# Patient Record
Sex: FEMALE | Race: ASIAN | NOT HISPANIC OR LATINO | Employment: FULL TIME
[De-identification: names, ages, dates, MRNs, and addresses within clinical notes are randomized per-mention and may not be internally consistent; named-entity substitution may affect disease eponyms.]

---

## 2018-05-10 ENCOUNTER — RECORDS - HEALTHEAST (OUTPATIENT)
Dept: ADMINISTRATIVE | Facility: OTHER | Age: 23
End: 2018-05-10

## 2018-05-10 LAB — PAP SMEAR - HIM PATIENT REPORTED: NORMAL

## 2019-10-23 ENCOUNTER — VIRTUAL VISIT (OUTPATIENT)
Dept: FAMILY MEDICINE | Facility: OTHER | Age: 24
End: 2019-10-23

## 2019-10-24 NOTE — PROGRESS NOTES
"Date: 10/23/2019 12:00:48  Clinician: Shereen Banda  Clinician NPI: [id number]  Patient: Blessing Bassett  Patient : 1995  Patient Address: [address], [place] [address]  Patient Phone: [phone]  Visit Protocol: URI  Patient Summary:  Blessing is a 24 year old ( : 1995 ) female who initiated a Visit for cold, sinus infection, or influenza. When asked the question \"Please sign me up to receive news, health information and promotions. \", Blessing responded \"No\".    Blessing states her symptoms started suddenly 3-6 days ago.   Her symptoms consist of a headache, a sore throat, malaise, a cough, myalgia, enlarged lymph nodes, and chills. Blessing also feels feverish but was unable to measure her temperature.   Symptom details     Cough: Blessing coughs a few times an hour and her cough is more bothersome at night. Phlegm comes into her throat when she coughs. She does not believe the phlegm causes the cough. The color of the phlegm is yellow and white.     Sore throat: Blessing reports having moderate throat pain (4-6 on a 10 point pain scale), does not have exudate on her tonsils, and can swallow liquids. The lymph nodes in her neck are enlarged. A rash has not appeared on the skin since the sore throat started.     Headache: She states the headache is moderate (4-6 on a 10 point pain scale).      Blessing denies having rhinitis, wheezing, teeth pain, facial pain or pressure, nasal congestion, and ear pain. She also denies taking antibiotic medication for the symptoms, having recent facial or sinus surgery in the past 60 days, having a sinus infection within the past year, and double sickening (worsening symptoms after initial improvement). She is not experiencing dyspnea.   Precipitating events  Within the past week, Blessing has not been exposed to someone with strep throat. She has not recently been exposed to someone with influenza. Blessing has not been in close contact with any high risk individuals.   Pertinent " medical history  Blessing does not get yeast infections when she takes antibiotics.   Weight: 150 lbs   Blessing does not smoke or use smokeless tobacco.   She denies pregnancy and denies breastfeeding. She has menstruated in the past month.     MEDICATIONS: Advil Liqui-Gel oral, ALLERGIES: NKDA  Clinician Response:  Dear Blessing,  Based on the information provided, you have a viral upper respiratory infection, otherwise known as a cold. Symptoms vary from person to person, but can include sneezing, coughing, a runny nose, sore throat, and headache and range from mild to severe.  Unfortunately, there are no medications that can cure a cold, so treatment is focused on controlling symptoms as much as possible. Most people gradually feel better until symptoms are gone in 1-2 weeks.  Medication information  Because you have a viral infection, antibiotics will not help you get better. Treating a viral infection with antibiotics could actually make you feel worse.  Self care  The following tips will keep you as comfortable as possible while you recover:     Rest    Drink plenty of water and other liquids    Take a hot shower to loosen congestion    Use throat lozenges    Gargle with warm salt water (1/4 teaspoon of salt per 8 ounce glass of water)    Suck on frozen items such as popsicles or ice cubes    Drink hot tea with lemon and honey    Take a spoonful of honey to reduce your cough     When to seek care  Please be seen in a clinic or urgent care if new symptoms develop, or symptoms become worse.  Call 911 or go to the emergency room if you feel that your throat is closing off, you suddenly develop a rash, you are unable to swallow fluids, you are drooling, or you are having difficulty breathing.   Diagnosis: Viral URI  Diagnosis ICD: J06.9

## 2020-01-14 ENCOUNTER — OFFICE VISIT - HEALTHEAST (OUTPATIENT)
Dept: FAMILY MEDICINE | Facility: CLINIC | Age: 25
End: 2020-01-14

## 2020-01-14 DIAGNOSIS — R31.0 GROSS HEMATURIA: ICD-10-CM

## 2020-01-14 DIAGNOSIS — J06.9 URI WITH COUGH AND CONGESTION: ICD-10-CM

## 2020-01-14 LAB
ALBUMIN SERPL-MCNC: 3 G/DL (ref 3.5–5)
ALBUMIN UR-MCNC: ABNORMAL MG/DL
ALP SERPL-CCNC: 62 U/L (ref 45–120)
ALT SERPL W P-5'-P-CCNC: 10 U/L (ref 0–45)
ANION GAP SERPL CALCULATED.3IONS-SCNC: 8 MMOL/L (ref 5–18)
APPEARANCE UR: CLEAR
AST SERPL W P-5'-P-CCNC: 13 U/L (ref 0–40)
BACTERIA #/AREA URNS HPF: ABNORMAL HPF
BILIRUB SERPL-MCNC: 0.2 MG/DL (ref 0–1)
BILIRUB UR QL STRIP: NEGATIVE
BUN SERPL-MCNC: 7 MG/DL (ref 8–22)
CALCIUM SERPL-MCNC: 8.7 MG/DL (ref 8.5–10.5)
CHLORIDE BLD-SCNC: 105 MMOL/L (ref 98–107)
CO2 SERPL-SCNC: 28 MMOL/L (ref 22–31)
COLOR UR AUTO: YELLOW
CREAT SERPL-MCNC: 0.73 MG/DL (ref 0.6–1.1)
GFR SERPL CREATININE-BSD FRML MDRD: >60 ML/MIN/1.73M2
GLUCOSE BLD-MCNC: 102 MG/DL (ref 70–125)
GLUCOSE UR STRIP-MCNC: NEGATIVE MG/DL
HGB UR QL STRIP: ABNORMAL
KETONES UR STRIP-MCNC: NEGATIVE MG/DL
LEUKOCYTE ESTERASE UR QL STRIP: ABNORMAL
NITRATE UR QL: NEGATIVE
PH UR STRIP: 7 [PH] (ref 5–8)
POTASSIUM BLD-SCNC: 3.7 MMOL/L (ref 3.5–5)
PROT SERPL-MCNC: 6.3 G/DL (ref 6–8)
RBC #/AREA URNS AUTO: ABNORMAL HPF
SODIUM SERPL-SCNC: 141 MMOL/L (ref 136–145)
SP GR UR STRIP: 1.01 (ref 1–1.03)
SQUAMOUS #/AREA URNS AUTO: ABNORMAL LPF
UROBILINOGEN UR STRIP-ACNC: ABNORMAL
WBC #/AREA URNS AUTO: ABNORMAL HPF

## 2020-01-14 RX ORDER — IBUPROFEN 200 MG
200 TABLET ORAL EVERY 6 HOURS PRN
Status: SHIPPED | COMMUNITY
Start: 2020-01-14

## 2020-01-14 ASSESSMENT — MIFFLIN-ST. JEOR: SCORE: 1318.75

## 2020-01-15 LAB — BACTERIA SPEC CULT: NO GROWTH

## 2020-01-28 ENCOUNTER — RECORDS - HEALTHEAST (OUTPATIENT)
Dept: HEALTH INFORMATION MANAGEMENT | Facility: CLINIC | Age: 25
End: 2020-01-28

## 2020-02-11 ENCOUNTER — RECORDS - HEALTHEAST (OUTPATIENT)
Dept: ADMINISTRATIVE | Facility: OTHER | Age: 25
End: 2020-02-11

## 2020-02-14 ENCOUNTER — AMBULATORY - HEALTHEAST (OUTPATIENT)
Dept: LAB | Facility: CLINIC | Age: 25
End: 2020-02-14

## 2020-02-14 DIAGNOSIS — R31.9 HEMATURIA SYNDROME: ICD-10-CM

## 2020-02-14 DIAGNOSIS — R80.9 PROTEINURIA: ICD-10-CM

## 2021-05-28 ENCOUNTER — RECORDS - HEALTHEAST (OUTPATIENT)
Dept: ADMINISTRATIVE | Facility: CLINIC | Age: 26
End: 2021-05-28

## 2021-06-04 VITALS
DIASTOLIC BLOOD PRESSURE: 72 MMHG | WEIGHT: 147.75 LBS | HEART RATE: 76 BPM | SYSTOLIC BLOOD PRESSURE: 118 MMHG | BODY MASS INDEX: 29.01 KG/M2 | HEIGHT: 60 IN

## 2021-06-05 NOTE — PROGRESS NOTES
Assessment & Plan   1. Gross hematuria  Gross hematuria x3 days without visible clots.  Significant proteinuria. Renal function normal however low albumin.  Consider possible association with recent URI. Recheck labs to confirm and urgent referral to nephrology.    - Ambulatory referral to Nephrology  - Urinalysis- if Indicated  - Comprehensive Metabolic Panel    2. URI with cough and congestion  Lungs clear on exam, reassured likely viral in nature    Vivi Simon CNP    Subjective   Chief Complaint:  Establish Care; Follow-up (pt was seen at an urgent care clinic on 1/13/20 and was told to f/u with a primary care provider about the results); and Cough (productive cough x 4 days; very thick phlegm )    HPI:   Blessing Bassett is a 24 y.o. female who presents for follow up.     PMH notable for appendectomy, otherwise healthy.     Seen in UC yesterday for hematuria.  Urine visibly pink in color.  LMP ended 1/9. UA with large blood, no obvious cystitis, culture negative. Significant proteinuria on UA as well. Normal renal function. Creatinine 0.78. Low serum albumin. Concern for possible nephrotic syndrome or glomerulonephritis.  No edema.     She states she first noted pink urine three days ago.  No visible blood clots.  No flank pain. Does have mild URI symptoms, these began mid last week. Chills/warm this past weekend but didn't measure temperature.  Feeling better.  Cough is still persistent. No vigorous exercise.  Grandparents have had stones, no other renal history.  No medications.  On Nexplanon.  Ibuprofen once a month with menstrual cycle.       Allergies:  has No Known Allergies.    SH/FH:  Social History and Family History reviewed and updated.   Tobacco Status:  She  reports that she has never smoked. She has never used smokeless tobacco.    Review of Systems:  A complete head to toe ROS is negative unless otherwise noted in HPI    Objective     Vitals:    01/14/20 1543   Weight: 147 lb 12 oz (67 kg)  "  Height: 4' 11.5\" (1.511 m)       Physical Exam:  GENERAL: Alert, well-appearing female .   PSYCH: Pleasant mood, affect appropriate.   MOUTH: Pharynx moist, pink without exudate. No tonsillar enlargement  NECK: No lymphadenopathy.  CV: Regular rate and rhythm without murmurs, rubs or gallops.  RESP: Lung sounds clear, no rhonchi or rales.   ABDOMEN: BS+. Abdomen soft, nontender to palpation without guarding. No organomegaly, masses or hernias  PV : No edema      "

## 2021-06-20 ENCOUNTER — HEALTH MAINTENANCE LETTER (OUTPATIENT)
Age: 26
End: 2021-06-20

## 2021-10-10 ENCOUNTER — HEALTH MAINTENANCE LETTER (OUTPATIENT)
Age: 26
End: 2021-10-10

## 2022-07-16 ENCOUNTER — HEALTH MAINTENANCE LETTER (OUTPATIENT)
Age: 27
End: 2022-07-16

## 2022-09-18 ENCOUNTER — HEALTH MAINTENANCE LETTER (OUTPATIENT)
Age: 27
End: 2022-09-18

## 2023-07-30 ENCOUNTER — HEALTH MAINTENANCE LETTER (OUTPATIENT)
Age: 28
End: 2023-07-30

## 2024-10-07 LAB
ABO/RH(D): NORMAL
ANTIBODY SCREEN: NEGATIVE
SPECIMEN EXPIRATION DATE: NORMAL

## 2024-10-08 ENCOUNTER — PRENATAL OFFICE VISIT (OUTPATIENT)
Dept: MIDWIFE SERVICES | Facility: CLINIC | Age: 29
End: 2024-10-08
Payer: COMMERCIAL

## 2024-10-08 VITALS
SYSTOLIC BLOOD PRESSURE: 116 MMHG | DIASTOLIC BLOOD PRESSURE: 72 MMHG | WEIGHT: 144 LBS | BODY MASS INDEX: 28.27 KG/M2 | HEIGHT: 60 IN

## 2024-10-08 DIAGNOSIS — D75.89 LOW MEAN CORPUSCULAR VOLUME (MCV) DURING PREGNANCY: ICD-10-CM

## 2024-10-08 DIAGNOSIS — O28.3 ABNORMAL FETAL ULTRASOUND: ICD-10-CM

## 2024-10-08 DIAGNOSIS — Z34.00 SUPERVISION OF NORMAL FIRST PREGNANCY, ANTEPARTUM: Primary | ICD-10-CM

## 2024-10-08 DIAGNOSIS — O99.119 LOW MEAN CORPUSCULAR VOLUME (MCV) DURING PREGNANCY: ICD-10-CM

## 2024-10-08 LAB
ERYTHROCYTE [DISTWIDTH] IN BLOOD BY AUTOMATED COUNT: 14.3 % (ref 10–15)
EST. AVERAGE GLUCOSE BLD GHB EST-MCNC: 100 MG/DL
HBA1C MFR BLD: 5.1 % (ref 0–5.6)
HBV SURFACE AG SERPL QL IA: NONREACTIVE
HCT VFR BLD AUTO: 35.9 % (ref 35–47)
HCV AB SERPL QL IA: NONREACTIVE
HGB BLD-MCNC: 11.7 G/DL (ref 11.7–15.7)
HIV 1+2 AB+HIV1 P24 AG SERPL QL IA: NONREACTIVE
MCH RBC QN AUTO: 23.6 PG (ref 26.5–33)
MCHC RBC AUTO-ENTMCNC: 32.6 G/DL (ref 31.5–36.5)
MCV RBC AUTO: 72 FL (ref 78–100)
PLATELET # BLD AUTO: 304 10E3/UL (ref 150–450)
RBC # BLD AUTO: 4.96 10E6/UL (ref 3.8–5.2)
RUBV IGG SERPL QL IA: 0.56 INDEX
RUBV IGG SERPL QL IA: NORMAL
T PALLIDUM AB SER QL: NONREACTIVE
WBC # BLD AUTO: 7.7 10E3/UL (ref 4–11)

## 2024-10-08 PROCEDURE — 86901 BLOOD TYPING SEROLOGIC RH(D): CPT | Performed by: ADVANCED PRACTICE MIDWIFE

## 2024-10-08 PROCEDURE — 87340 HEPATITIS B SURFACE AG IA: CPT | Performed by: ADVANCED PRACTICE MIDWIFE

## 2024-10-08 PROCEDURE — 83036 HEMOGLOBIN GLYCOSYLATED A1C: CPT | Performed by: ADVANCED PRACTICE MIDWIFE

## 2024-10-08 PROCEDURE — 86762 RUBELLA ANTIBODY: CPT | Performed by: ADVANCED PRACTICE MIDWIFE

## 2024-10-08 PROCEDURE — 87086 URINE CULTURE/COLONY COUNT: CPT | Performed by: ADVANCED PRACTICE MIDWIFE

## 2024-10-08 PROCEDURE — 86780 TREPONEMA PALLIDUM: CPT | Performed by: ADVANCED PRACTICE MIDWIFE

## 2024-10-08 PROCEDURE — 86900 BLOOD TYPING SEROLOGIC ABO: CPT | Performed by: ADVANCED PRACTICE MIDWIFE

## 2024-10-08 PROCEDURE — 84702 CHORIONIC GONADOTROPIN TEST: CPT | Performed by: ADVANCED PRACTICE MIDWIFE

## 2024-10-08 PROCEDURE — 85027 COMPLETE CBC AUTOMATED: CPT | Performed by: ADVANCED PRACTICE MIDWIFE

## 2024-10-08 PROCEDURE — 36415 COLL VENOUS BLD VENIPUNCTURE: CPT | Performed by: ADVANCED PRACTICE MIDWIFE

## 2024-10-08 PROCEDURE — 83020 HEMOGLOBIN ELECTROPHORESIS: CPT | Performed by: ADVANCED PRACTICE MIDWIFE

## 2024-10-08 PROCEDURE — 86850 RBC ANTIBODY SCREEN: CPT | Performed by: ADVANCED PRACTICE MIDWIFE

## 2024-10-08 PROCEDURE — G0145 SCR C/V CYTO,THINLAYER,RESCR: HCPCS | Performed by: ADVANCED PRACTICE MIDWIFE

## 2024-10-08 PROCEDURE — 87389 HIV-1 AG W/HIV-1&-2 AB AG IA: CPT | Performed by: ADVANCED PRACTICE MIDWIFE

## 2024-10-08 PROCEDURE — 87624 HPV HI-RISK TYP POOLED RSLT: CPT | Performed by: ADVANCED PRACTICE MIDWIFE

## 2024-10-08 PROCEDURE — 99204 OFFICE O/P NEW MOD 45 MIN: CPT | Performed by: ADVANCED PRACTICE MIDWIFE

## 2024-10-08 PROCEDURE — 86803 HEPATITIS C AB TEST: CPT | Performed by: ADVANCED PRACTICE MIDWIFE

## 2024-10-08 RX ORDER — VITAMIN A, VITAMIN C, VITAMIN D-3, VITAMIN E, VITAMIN B-1, VITAMIN B-2, NIACIN, VITAMIN B-6, CALCIUM, IRON, ZINC, COPPER 4000; 120; 400; 22; 1.84; 3; 20; 10; 1; 12; 200; 27; 25; 2 [IU]/1; MG/1; [IU]/1; MG/1; MG/1; MG/1; MG/1; MG/1; MG/1; UG/1; MG/1; MG/1; MG/1; MG/1
TABLET ORAL DAILY
COMMUNITY

## 2024-10-08 ASSESSMENT — EDINBURGH POSTNATAL DEPRESSION SCALE (EPDS)
I HAVE BEEN ABLE TO LAUGH AND SEE THE FUNNY SIDE OF THINGS: AS MUCH AS I ALWAYS COULD
I HAVE LOOKED FORWARD WITH ENJOYMENT TO THINGS: AS MUCH AS I EVER DID
TOTAL SCORE: 7
I HAVE BLAMED MYSELF UNNECESSARILY WHEN THINGS WENT WRONG: NO, NEVER
I HAVE FELT SCARED OR PANICKY FOR NO GOOD REASON: NO, NOT MUCH
I HAVE BEEN ANXIOUS OR WORRIED FOR NO GOOD REASON: YES, SOMETIMES
THE THOUGHT OF HARMING MYSELF HAS OCCURRED TO ME: HARDLY EVER
I HAVE BEEN SO UNHAPPY THAT I HAVE BEEN CRYING: ONLY OCCASIONALLY
I HAVE BEEN SO UNHAPPY THAT I HAVE HAD DIFFICULTY SLEEPING: NOT AT ALL
I HAVE FELT SAD OR MISERABLE: NOT VERY OFTEN
THINGS HAVE BEEN GETTING ON TOP OF ME: NO, MOST OF THE TIME I HAVE COPED QUITE WELL

## 2024-10-08 NOTE — PROGRESS NOTES
"PRENATAL VISIT   FIRST OBSTETRICAL EXAM - OB     Assessment / Impression   First prenatal visit at 8w4d  29 year old    Last pap = today  Pre-pregnant BMI 28.61  EDPS = 6, \"1\" to #10    Plan:     -IOB labs drawn including HgbA1C.   -Pt is not a candidate for drawing lead level per [facility] screening tool.   -Patient is not interested in waterbirth.  Waterbirth education shared in IOB packet.   -Reviewed prenatal care schedule.   -Optimal nutrition and weight gain discussed. Pregnancy weight gain of 15-25 lbs (BMI 25.0-29.9) encouraged.   -Anticipatory guidance for common pregnancy questions and concerns reviewed.   -Danger s/sx for this trimester reviewed with patient.   -Reviewed genetic screening options with patient, patient does not elect for first trimester screening. The patient does not elect for quad screening.   -Reviewed carrier testing options with patient, patient does not elect for testing or referral to genetic counseling.  -Dating ultrasound ordered, patient to schedule   -IOB packet given and reviewed with patient.   -CNM services and hospital options reviewed; emergency and scheduling phone numbers given to patient.   -Because the patient does not have 1 high risk nor 2 or more moderate risk factors, low-dose aspirin not be initiated.  -Antepartum VTE risk factors absent.  -Pt is not a candidate for an antepartum OB consult.    -Return to clinic in 4 weeks or sooner as needed.    Total time: 45 minutes spent on the date of the encounter doing chart review, review of test results, patient visit and documentation.     Subjective:   Blessing Bassett is a 29 year old  here today for her first obstetrical exam at Unknown. Here with Valerio . This pregnancy is planned. The patient reports fatigue. She has a certain LMP.  Patient's last menstrual period was 2024 (exact date)., predicting an expected date of delivery of Estimated Date of Delivery: May 16, 2025. Last period was normal. Her pregnancy is " "dated by LMP.     The patient states that she is in a monogamous relationship and states that she is safe. Offered GC/CT screening today and patient declines. Additional pregnancy symptoms include: fatigue.     Risk factors: none. Pregnancy Risk Factors:None    The patient has the following high risk factors for preeclampsia: none.   The patient has the following moderate risk factors for preeclampsia: first pregnancy    The patient has the following antepartum risk factors for VTE (two or more risk factors, or 1 * risk factor, places patient at higher risk): none.    Clinical history/risk factors requiring antepartum OB consult: none.    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   OB History    Para Term  AB Living   1 0 0 0 0 0   SAB IAB Ectopic Multiple Live Births   0 0 0 0 0      # Outcome Date GA Lbr Amado/2nd Weight Sex Type Anes PTL Lv   1 Current                 History:   Past Medical History:   Diagnosis Date    Varicella       Past Surgical History:   Procedure Laterality Date    APPENDECTOMY        No family history on file.   Social History     Tobacco Use    Smoking status: Never    Smokeless tobacco: Never      Current Outpatient Medications   Medication Sig Dispense Refill    etonogestrel (NEXPLANON) 68 mg Impl implant [ETONOGESTREL (NEXPLANON) 68 MG IMPL IMPLANT] 1 each by Subdermal route once. Inserted 2018      ibuprofen (ADVIL,MOTRIN) 200 MG tablet [IBUPROFEN (ADVIL,MOTRIN) 200 MG TABLET] Take 200 mg by mouth every 6 (six) hours as needed for pain.        No Known Allergies     The patient's medical, surgical and family histories were reviewed and were pertinent to this visit.     Pap smear: Last Pap: 2018, Result: NIL, Previous History: none, Any history of abnormal: no. Next Due: today.     EPDS score today: 6.\"1\" to #10, patient denies any concerns and reports good family support.    History of anxiety or " "depression: no    Review of Systems   General: Fatigue but otherwise denies problem   Eyes: Denies problem   Ears/Nose/Throat: Denies problem   Cardiovascular: Denies problem   Respiratory: Denies problem   Gastrointestinal: Nausea without vomiting, otherwise negative   Genitourinary: Denies any discharge, vaginal bleeding or itchiness or any other problem   Musculoskeletal: Breast tenderness otherwise denies problem   Skin: Denies problem   Neurologic: Denies problem   Psychiatric: Denies problem   Endocrine: Denies problem   Heme/Lymphatic: Denies problem   Allergic/Immunologic: Denies problem           Objective:   Objective    Vitals:    10/08/24 1258   BP: 116/72   Weight: 65.3 kg (144 lb)   Height: 1.511 m (4' 11.5\")        Physical Exam:   General Appearance: Alert, cooperative, no distress, appears stated age   HEENT: Normocephalic, without obvious abnormality, atraumatic. Conjunctiva/corneas clear   Neck: Supple, symmetrical, trachea midline, no adenopathy.   Thyroid: not enlarged, symmetric, no tenderness/mass/nodules   Back: Symmetric, no curvature, ROM normal, no CVA tenderness   Lungs: Clear to auscultation bilaterally, respirations unlabored   Heart: Regular rate and rhythm, S1 and S2 normal, no murmur, rub, or gallop. No edema to lower extremities.   Breasts: deferred   Abdomen: Gravid, soft, non-tender, bowel sounds active all four quadrants, no masses.    Vulva:  no sign of lesions or condyloma, normal hair distribution   Vagina: pink, normal rugae, no abnormal discharge   Cervix:  long/thick/closed, no lesions or inflammation noted, negative CMT with exam   Uterus: mid position, non tender, enlarged approximately 9 weeks size   Adnexa:  no masses appreciated, non-tender with palpation   Musculoskeletal: Extremities normal, atraumatic, no cyanosis   Skin: Skin color, texture, turgor normal, no rashes or lesions   Lymph nodes: Cervical, supraclavicular, and axillary nodes normal   Neurologic: Alert " and oriented x 3. Normal speech

## 2024-10-08 NOTE — NURSING NOTE
Depression Response    Patient completed the PHQ-9 assessment for depression and scored >9? No  Question 9 on the PHQ-9 was positive for suicidality? Yes  Does patient have current mental health provider? No    Is this a virtual visit? No    I personally notified the following: visit provider

## 2024-10-09 ENCOUNTER — HOSPITAL ENCOUNTER (OUTPATIENT)
Dept: ULTRASOUND IMAGING | Facility: HOSPITAL | Age: 29
Discharge: HOME | End: 2024-10-09
Attending: ADVANCED PRACTICE MIDWIFE | Admitting: ADVANCED PRACTICE MIDWIFE
Payer: COMMERCIAL

## 2024-10-09 DIAGNOSIS — O28.3 ABNORMAL FETAL ULTRASOUND: Primary | ICD-10-CM

## 2024-10-09 DIAGNOSIS — Z34.00 SUPERVISION OF NORMAL FIRST PREGNANCY, ANTEPARTUM: ICD-10-CM

## 2024-10-09 LAB
BACTERIA UR CULT: NO GROWTH
HCG INTACT+B SERPL-ACNC: ABNORMAL MIU/ML
HPV HR 12 DNA CVX QL NAA+PROBE: NEGATIVE
HPV16 DNA CVX QL NAA+PROBE: NEGATIVE
HPV18 DNA CVX QL NAA+PROBE: NEGATIVE
HUMAN PAPILLOMA VIRUS FINAL DIAGNOSIS: NORMAL

## 2024-10-09 PROCEDURE — 76801 OB US < 14 WKS SINGLE FETUS: CPT

## 2024-10-11 ENCOUNTER — TELEPHONE (OUTPATIENT)
Dept: MIDWIFE SERVICES | Facility: CLINIC | Age: 29
End: 2024-10-11

## 2024-10-11 ENCOUNTER — LAB (OUTPATIENT)
Dept: LAB | Facility: HOSPITAL | Age: 29
End: 2024-10-11
Payer: COMMERCIAL

## 2024-10-11 ENCOUNTER — NURSE TRIAGE (OUTPATIENT)
Dept: NURSING | Facility: CLINIC | Age: 29
End: 2024-10-11

## 2024-10-11 DIAGNOSIS — O28.3 ABNORMAL FETAL ULTRASOUND: ICD-10-CM

## 2024-10-11 LAB
BKR AP ASSOCIATED HPV REPORT: NORMAL
BKR LAB AP GYN ADEQUACY: NORMAL
BKR LAB AP GYN INTERPRETATION: NORMAL
BKR LAB AP LMP: NORMAL
BKR LAB AP PREVIOUS ABNORMAL: NORMAL
HCG INTACT+B SERPL-ACNC: ABNORMAL MIU/ML
HEMOGLOBIN A2 QUANTITATION: 2.2 % (ref 2.2–3.5)
HEMOGLOBIN A: 97.8 % (ref 94.5–97.8)
HEMOGLOBIN ELECTROPHRESIS: NORMAL
HEMOGLOBIN F QUANTITATION: <0.5 % (ref 0–2)
PATH REPORT.COMMENTS IMP SPEC: NORMAL
PATH REPORT.COMMENTS IMP SPEC: NORMAL
PATH REPORT.RELEVANT HX SPEC: NORMAL

## 2024-10-11 PROCEDURE — 36415 COLL VENOUS BLD VENIPUNCTURE: CPT

## 2024-10-11 PROCEDURE — 84702 CHORIONIC GONADOTROPIN TEST: CPT

## 2024-10-12 NOTE — TELEPHONE ENCOUNTER
Pt calling back stating she she missed a call from her CNM. Note states pt was called regarding labs. Labs reviewed which are not appropriate for FNA to discuss with pt. CNM paged to call pt back.     Alma Nickerson RN  [facility] Nurse Advisor   10/11/2024  7:27 PM

## 2024-10-12 NOTE — TELEPHONE ENCOUNTER
Telephone call from Blessing, returning my call. Discussed high but falling Beta HCG results in conjunction with ultrasound results and confirmation of what was a suspected MAB. Patient doing okay emotionally, well supported by her partner. Reviewed options briefly with patient, but at this point she desires expectant management and hopes that the pregnancy passes on its own. Encouraged patient to call or come in for an appointment in 2 weeks if she has started bleeding by that time to further discuss options. Reviewed what to expect and warning sxs in detail with patient. Patient to call with heavy bleeding lasting more that an hour or two, sxs of infection, intolerable pain or with any additional questions or concerns. All questions answered and patient is in agreement with the plan.    DENNYS Frankel, ASHLEYM  [facility] Nurse-Midwives [facility]

## 2024-12-01 ENCOUNTER — HEALTH MAINTENANCE LETTER (OUTPATIENT)
Age: 29
End: 2024-12-01